# Patient Record
Sex: FEMALE | Race: WHITE | Employment: OTHER | URBAN - METROPOLITAN AREA
[De-identification: names, ages, dates, MRNs, and addresses within clinical notes are randomized per-mention and may not be internally consistent; named-entity substitution may affect disease eponyms.]

---

## 2017-04-17 PROBLEM — Z90.12: Status: ACTIVE | Noted: 2017-04-17

## 2017-04-17 PROBLEM — Z90.11: Status: ACTIVE | Noted: 2017-04-17

## 2019-05-09 ENCOUNTER — ANESTHESIA EVENT (OUTPATIENT)
Dept: ENDOSCOPY | Age: 64
End: 2019-05-09
Payer: COMMERCIAL

## 2019-05-09 RX ORDER — SODIUM CHLORIDE, SODIUM LACTATE, POTASSIUM CHLORIDE, CALCIUM CHLORIDE 600; 310; 30; 20 MG/100ML; MG/100ML; MG/100ML; MG/100ML
100 INJECTION, SOLUTION INTRAVENOUS CONTINUOUS
Status: CANCELLED | OUTPATIENT
Start: 2019-05-09

## 2019-05-10 ENCOUNTER — ANESTHESIA (OUTPATIENT)
Dept: ENDOSCOPY | Age: 64
End: 2019-05-10
Payer: COMMERCIAL

## 2019-05-10 ENCOUNTER — HOSPITAL ENCOUNTER (OUTPATIENT)
Age: 64
Setting detail: OUTPATIENT SURGERY
Discharge: HOME | End: 2019-05-10
Attending: SURGERY | Admitting: SURGERY
Payer: COMMERCIAL

## 2019-05-10 VITALS
OXYGEN SATURATION: 100 % | BODY MASS INDEX: 19.26 KG/M2 | SYSTOLIC BLOOD PRESSURE: 163 MMHG | RESPIRATION RATE: 16 BRPM | HEART RATE: 66 BPM | HEIGHT: 61 IN | WEIGHT: 102 LBS | DIASTOLIC BLOOD PRESSURE: 81 MMHG | TEMPERATURE: 98.6 F

## 2019-05-10 PROCEDURE — 74011250636 HC RX REV CODE- 250/636

## 2019-05-10 PROCEDURE — 74011250636 HC RX REV CODE- 250/636: Performed by: ANESTHESIOLOGY

## 2019-05-10 PROCEDURE — 76060000031 HC ANESTHESIA FIRST 0.5 HR: Performed by: SURGERY

## 2019-05-10 PROCEDURE — 76040000025: Performed by: SURGERY

## 2019-05-10 PROCEDURE — 77030021593 HC FCPS BIOP ENDOSC BSC -A: Performed by: SURGERY

## 2019-05-10 RX ORDER — SODIUM CHLORIDE, SODIUM LACTATE, POTASSIUM CHLORIDE, CALCIUM CHLORIDE 600; 310; 30; 20 MG/100ML; MG/100ML; MG/100ML; MG/100ML
100 INJECTION, SOLUTION INTRAVENOUS CONTINUOUS
Status: DISCONTINUED | OUTPATIENT
Start: 2019-05-10 | End: 2019-05-10 | Stop reason: HOSPADM

## 2019-05-10 RX ORDER — PROPOFOL 10 MG/ML
INJECTION, EMULSION INTRAVENOUS AS NEEDED
Status: DISCONTINUED | OUTPATIENT
Start: 2019-05-10 | End: 2019-05-10 | Stop reason: HOSPADM

## 2019-05-10 RX ORDER — LIDOCAINE HYDROCHLORIDE 20 MG/ML
INJECTION, SOLUTION EPIDURAL; INFILTRATION; INTRACAUDAL; PERINEURAL AS NEEDED
Status: DISCONTINUED | OUTPATIENT
Start: 2019-05-10 | End: 2019-05-10 | Stop reason: HOSPADM

## 2019-05-10 RX ADMIN — PROPOFOL 40 MG: 10 INJECTION, EMULSION INTRAVENOUS at 11:13

## 2019-05-10 RX ADMIN — LIDOCAINE HYDROCHLORIDE 60 MG: 20 INJECTION, SOLUTION EPIDURAL; INFILTRATION; INTRACAUDAL; PERINEURAL at 11:13

## 2019-05-10 RX ADMIN — SODIUM CHLORIDE, SODIUM LACTATE, POTASSIUM CHLORIDE, AND CALCIUM CHLORIDE 100 ML/HR: 600; 310; 30; 20 INJECTION, SOLUTION INTRAVENOUS at 10:56

## 2019-05-10 RX ADMIN — LIDOCAINE HYDROCHLORIDE 20 MG: 20 INJECTION, SOLUTION EPIDURAL; INFILTRATION; INTRACAUDAL; PERINEURAL at 11:15

## 2019-05-10 RX ADMIN — LIDOCAINE HYDROCHLORIDE 20 MG: 20 INJECTION, SOLUTION EPIDURAL; INFILTRATION; INTRACAUDAL; PERINEURAL at 11:17

## 2019-05-10 RX ADMIN — LIDOCAINE HYDROCHLORIDE 20 MG: 20 INJECTION, SOLUTION EPIDURAL; INFILTRATION; INTRACAUDAL; PERINEURAL at 11:20

## 2019-05-10 NOTE — H&P
History and Physical      Patient: Emmett Lyons    Physician: Jocy Woo MD    Referring Physician: Lamar Azevedo NP    Chief Complaint: For upper endoscopy    History of Present Illness: Pt presents for EGD for: hawk's surveillance. EGD history-     9/2011- no Hawk's  6/29/12-(+)  6/11/14-pos  9/5/14-pos  9/16/16-pos        History:  Past Medical History:   Diagnosis Date    Abdominal pain, left lower quadrant     Arthritis     fingers    Hawk's esophagus     Calculus of kidney     Chronic pain     abdominal pain    Diverticulosis     Former smoker     GERD (gastroesophageal reflux disease)     hx of nissen fundoplication---takes omeprazole--states well controlled    History of kidney stones     Hyperlipidemia     managed w/med    Hypertension     managed w/med    IBS (irritable bowel syndrome)     Inguinal hernia, right     Post-operative nausea and vomiting     \"IV meds worked\" per pt    Postcholecystectomy diarrhea     PUD (peptic ulcer disease) 2014    omeprazole bid    Trigger finger     thumb on right hand; resolved per pt.      Past Surgical History:   Procedure Laterality Date    HX ABDOMINAL WALL DEFECT REPAIR  09/2015    lysis of abdominal; adhesions    HX BREAST RECONSTRUCTION Bilateral 11/8/2016    BILATERAL BREAST RECONSTRUCTION WITH PLACEMENT OF TISSUE EXPANDERS performed by Warden Stacia MD at [address] HX CHOLECYSTECTOMY      HX COLONOSCOPY  2012 2014 EGD    HX GI      nissen fundoplication    HX HERNIA REPAIR Left 10/2015    inguinal    HX HERNIA REPAIR Right     HX HYSTERECTOMY  1980    HX LYSIS OF ADHESIONS      HX MASTECTOMY Bilateral 11/8/2016    BILATERAL BREAST MASTECTOMY SIMPLE performed by Jocy Woo MD at [address] HX SALPINGO-OOPHORECTOMY  1981    HX TONSILLECTOMY      HX UROLOGICAL      kidney stone extraction x 2    [place] EGD TRANSORAL BIOPSY SINGLE/MULTIPLE  6/29/2012         [place] EGD TRANSORAL BIOPSY SINGLE/MULTIPLE  2014         [place] EGD TRANSORAL BIOPSY SINGLE/MULTIPLE  2014         MI EGD TRANSORAL BIOPSY SINGLE/MULTIPLE  2016           Social History     Socioeconomic History    Marital status:      Spouse name: Not on file    Number of children: Not on file    Years of education: Not on file    Highest education level: Not on file   Tobacco Use    Smoking status: Former Smoker     Packs/day: 0.50     Years: 20.00     Pack years: 10.00     Last attempt to quit: 2013     Years since quittin.0    Smokeless tobacco: Never Used   Substance and Sexual Activity    Alcohol use: Yes     Alcohol/week: 0.0 oz     Comment: occasionally drinks wine    Drug use: No    Sexual activity: Yes     Partners: Male     Comment: hysterectomy      Family History   Problem Relation Age of Onset    Cancer Mother         Breast    Hypertension Mother     Heart Disease Mother     Heart Disease Father 71        MI    Hypertension Sister     Breast Cancer Sister 61    Cancer Sister         breast    No Known Problems Brother        Medications:   Prior to Admission medications    Medication Sig Start Date End Date Taking? Authorizing Provider   doxylamine succinate (UNISOM) 25 mg tablet Take 25 mg by mouth nightly as needed for Sleep. Yes Provider, Historical   estradiol cypionate in oil (DEPO-ESTRADIOL) 5 mg/mL injection 1 cc IM every 3-4 weeks 17  Yes Patricia Burnett MD   omeprazole (PRILOSEC) 40 mg capsule Take 1 Cap by mouth two (2) times a day. Indications: GASTRIC ULCER, GASTROESOPHAGEAL REFLUX, MAINTENANCE OF HEALING EROSIVE ESOPHAGITIS 16  Yes Lexus Brody MD   losartan-hydrochlorothiazide [facility]) 50-12.5 mg per tablet Take 1 Tab by mouth daily. Indications: HYPERTENSION  Patient taking differently: Take 1 Tab by mouth every morning. Indications: HYPERTENSION 9/17/15  Yes Venkatesh Dinero NP   multivitamin (ONE A DAY) tablet Take 1 Tab by mouth daily.  Holding for procedure  Indications: VITAMIN DEFICIENCY PREVENTION, hold for  surgery 10/6/15   Yes Provider, Historical   Cholecalciferol, Vitamin D3, (VITAMIN D) 2,000 unit Cap Take 2,000 Units by mouth daily. Indications: PREVENTION OF VITAMIN D DEFICIENCY, hold for  surgery 10/6/15   Yes Provider, Historical   omega-3 fatty acids-vitamin e (FISH OIL) 1,000 mg Cap Take 1 Cap by mouth daily (after breakfast). Indications: hold for  surgery 10/6/15   Yes Provider, Historical       Allergies: No Known Allergies    Physical Exam:     Vital Signs:   Visit Vitals  /76   Pulse 62   Temp 97.8 °F (36.6 °C)   Resp 18   Ht 5' 1\" (1.549 m)   Wt 102 lb (46.3 kg)   SpO2 99%   BMI 19.27 kg/m²     . General: no distress      Heart: regular   Lungs: unlabored   Abdominal: soft   Neurological: Grossly normal        Findings/Diagnosis: Munguia's esophagus    Plan of Care/Planned Procedure: Esophagogastroduodenoscopy, possible biopsy. Risks of endoscopy  include bleeding and perforation. They understand and agree to proceed.       Signed:  Magnus Aguilar MD   5/10/2019

## 2019-05-10 NOTE — ANESTHESIA PREPROCEDURE EVALUATION
Relevant Problems No relevant active problems Anesthetic History PONV Review of Systems / Medical History Pertinent labs reviewed Pulmonary Within defined limits Neuro/Psych Within defined limits Cardiovascular Hypertension Exercise tolerance: >4 METS 
  
GI/Hepatic/Renal 
  
GERD Renal disease: stones Comments: S/p Nissen IBS Munguia's esophagus Endo/Other Arthritis Other Findings Physical Exam 
 
Airway Mallampati: II 
TM Distance: 4 - 6 cm Neck ROM: normal range of motion Mouth opening: Normal 
 
 Cardiovascular Regular rate and rhythm,  S1 and S2 normal,  no murmur, click, rub, or gallop Dental 
 
Dentition: Full lower dentures and Upper partial plate Pulmonary Breath sounds clear to auscultation Abdominal 
GI exam deferred Other Findings Anesthetic Plan ASA: 2 Anesthesia type: total IV anesthesia Induction: Intravenous Anesthetic plan and risks discussed with: Patient

## 2019-05-10 NOTE — DISCHARGE INSTRUCTIONS
Gastrointestinal Esophagogastroduodenoscopy (EGD) - Upper Exam Discharge Instructions    1. Call Dr. Kevon Nelson at [phone] for any problems or questions. 2. Contact the doctor's office for follow up appointment as directed. 3. Medication may cause drowsiness for several hours, therefore:  · Do not drive or operate machinery for remainder of the day. · No alcohol today. · Do not make any important or legal decisions for 24 hours. · Do not sign any legal documents for 24 hours. 5. Ordinarily, you may resume regular diet and activity after exam unless otherwise specified by your physician. 6. For mild soreness in your throat you may use Cepacol throat lozenges or warm salt-water gargles as needed. Any additional instructions:    -MD will call with biopsy results  -Follow up with GI doctor in [place]     Instructions given to Chava Hui and other family members.   Instructions given by:  Suresh Ross RN

## 2019-05-10 NOTE — ANESTHESIA POSTPROCEDURE EVALUATION
Procedure(s): ESOPHAGOGASTRODUODENOSCOPY (EGD)/ 18 
ESOPHAGOGASTRODUODENAL (EGD) BIOPSY. total IV anesthesia Anesthesia Post Evaluation Patient location during evaluation: PACU Patient participation: complete - patient participated Level of consciousness: awake Pain management: satisfactory to patient Airway patency: patent Anesthetic complications: no 
Cardiovascular status: hemodynamically stable Respiratory status: spontaneous ventilation Hydration status: euvolemic Post anesthesia nausea and vomiting:  none Vitals Value Taken Time /81 5/10/2019 11:58 AM  
Temp Pulse 60 5/10/2019 12:01 PM  
Resp 16 5/10/2019 11:58 AM  
SpO2 99 % 5/10/2019 12:01 PM  
Vitals shown include unvalidated device data.

## 2019-05-10 NOTE — PROCEDURES
Esophagogastroduodenoscopy Procedure Note      Patient: Malcolm Holguin MRN: [id number]     YOB: 1955  Age: 75 y.o. Sex: female           Procedure in Detail:  Informed consent was obtained for the procedure, the patient was brought to the endoscopy suite and sedation was induced by anesthesia. The [id number] gastroscope was inserted into the mouth and advanced under direct vision to second portion of the duodenum. A careful inspection was made as the gastroscope was withdrawn, including a retroflexed view of the proximal stomach; findings and interventions are described below, with appropriate photodocumentation obtained. Findings:   Oropharynx:  Normal  Esophagus:    - Flat lesions:     - irregular, scallopped EGJ in addition to solitary 5mm island of probable Munguia's approx 5mm proximal  Cardia of the stomach:  Normal  Body of the stomach:    - Flat lesions:     - mild gastritis  Fundus of the stomach:    - Evidence of prior surgery: Nissen Fundoplication  Antrum of the stomach:    - Flat lesions:     - biliary gastritis  Duodenum:  Normal    Therapies:    biopsy of esophagus    EBL-  minimal    Specimens: Specimens were collected and sent to pathology. Complications:   None; patient tolerated the procedure well. Recommendations:  - Continue acid suppression.  - Await pathology. Continue f/u with GI in [place].   No significant breakdown of prior HH repair/fundoplication    Signed by: Milo Lucas MD                         May 10, 2019

## 2019-05-10 NOTE — ROUTINE PROCESS
Patient discharged. Passing flatus. Tolerating po fluids. Belongings returned. No acute distress noted. Escorted to car, with family by Lonkevin Cleveland.

## 2020-07-08 RX ORDER — SODIUM CHLORIDE 0.9 % (FLUSH) 0.9 %
5-40 SYRINGE (ML) INJECTION AS NEEDED
Status: CANCELLED | OUTPATIENT
Start: 2020-07-08

## 2020-07-08 RX ORDER — SODIUM CHLORIDE 0.9 % (FLUSH) 0.9 %
5-40 SYRINGE (ML) INJECTION EVERY 8 HOURS
Status: CANCELLED | OUTPATIENT
Start: 2020-07-08

## 2020-07-08 NOTE — H&P
2020  Nola Shipley, 77, F   [facility] Plastic Surgery   Pre-Operative History and Physical  Patient Information  :  Patient Information-  Nola Shipley   : 1955   Scheduled Services for : Lexie Hernandez   Appt Purpose: Blepharoplasty Upper - Bilateral Appt Notes: Surgery 20   Hospital PreOp by Phone   Referral Source: Patient   Occupation:   Insurance: [id number] Benefits/[employer]ign[employer] Plan: [id number] Benefits/Cigna   Surgery date: 2020 Blepharoplasty Upper - Bilateral Dr Anita NELSON   Medications / Allergies  :  Non-steroidal counseling  Patient was counseled to avoid all non-steroidals for 7-10 days before any surgery   List any allergies: Allergy   1 NKA   List any current medications (Please include over-the-counter medications)      Drug   1. blood pressure   2. cholesterol   3. acid reflux   Non-steroidal anti-inflammatory drugs (ex. Motrin, Aleve)  Patient admits regular use of NSAID's. Do you take vitamins/minerals? Yes If yes, what kind?     multi, fish oil    Height / Weight / BMI:  Height/Weight/BMI  Her height is 5ft 1in and weight is 103lbs. BMI is 19.46. Social / Family History:  ~MUS2 - Smoking Status  Former smoker. Social History  Patient denies using illegal drugs, denies high risk factors and denies alcohol use.    Family History  Breast Cancer Mother, Sister   High Blood Pressure  Vital Signs:  ~MUS2 - Blood Pressure  120/80   Vitals  Pulse 67 and RAPS02 97%   Pre-Operative Diagnosis:  Diagnosis - Text  Diagnosis: Upper Lid Dermatochalasis   Planned Procedure:  Signature        Planned Procedure  Upper Lid Blepheroplasty   History of Present Illness  Past Medical / Surgical History:  Past Medical History  High Blood Pressure <Details>   Kidney Stones <Details>  Review of Systems (check all current symptoms)  arthritis/joint disformity - no asthma - no chest pain - no heart attack - no high blood pressure *YES* inflamation of veins - no murmur - no pacemaker - no phlebitis - no convulsions - no epilepsy or seizures - no fainting - no fever or chills - no heart disease - no nausea, vomiting, diarrheah when taking antibiotics - no unexpected weight loss or weight gain - no chronic cough - no hearing loss - no morning cough - no sinus disorder - no diabetes - no excessive thirst/hunger - no frequent urination - no thyroid disorder - no abdominal pain - no chronic diarrhea or constipation - no gastro-intestinal problems - no irregular heartbeat - no stomach absorptive disorder - no ulcers - no bladder problems - no currently breast feeding - no currently pregnant - no frequency/burning during urination - no kidney problems - no yeast infection - no anemia - no bleed easily - no blood clots - no blood transfusion - no arthralgia - no artificial joints - no limited motion in joints - no muscle weakness - no paralysis - no stroke - no numbness or tingling - no headache - no migraines - no bronchitis - no emphysema - no shortness of breath - no tuberculosis - no wheezing - no psychiatric treatment - no recent crisis - no history of psychiatric hospitilization - no   Past Surgeries  hysterectomy   galbladder   mastectomy  Anesthesia History  - No past anesthesia problems. Ethnic Origin         Ethnic Origin II. Fair-skinned (Caucasians with light hair and light eyes)   Diagnosis Codes   :  Diagnosis - Eyes2  Dermatochalasis , Upper Right and Dermatochalasis, Upper Left   Diagnosis Codes  Eyes   Pre-Operative Physical Exam:  HEENT  HEENT: Deferred. Cardiac Exam II  Cardiac Exam: RRR and WNL. Pulmonary Exam  Pulmonary Exam: WNL. Abdominal Exam  Abdominal Exam: Deferred.    Extremity & Neuro Exam  Pertinent System/Surgical Site Exam  Pre-Operative Checklist / Risks & Benefits:  Blepharoplasty - Upper Risks Discussed  The risks of an upper lid blepharoplasty were reviewed with the patient, their questions were answered, and they have reviewed and signed off on the appropriate ASPS consent form. They state they understand the risks to include, but not be limited to: bleeding, infection, scars, asymmetry, poor cosmetic result, chronic dry or irritable eyes, inability to close the eyelid, corneal injury, muscle injury, blindness, loss of lashes, and the need for additional surgery. Understanding these issues they wish to proceed. The office policies regarding costs associated with revision was discussed as well. Pre-op Checklist  1. Patient seen preoperatively. 2. All patient questions answered. 3. Risks and benefits (including alternative treatments) reviewed. 4. Surgery time and date reviewed. 5. Proper prescriptions given. 6. Photographs taken  7. Complications both expected and unexpected discussed  8.  Patient verbalized understanding of the outcome possibilites inherent with this procedure  Pre-Op Risks and Benefits  bleeding, scar, infection, less than aesthetic result, need for additional surgery, bruising, swelling, hypertrophic scar, wound breakdown, recurrence, asymmetry, numbness, contour irregularities, hematoma, seroma, DVT, under or over resection and partial skin flap loss   Surgical Risks Discussed  Blepharoplasty - Upper   Pre-Operative Orders:  Pre-Operative Antibiotic Order Selection  Cefazolin 1GM IV piggyback (<80kg)   Pre-Operative Orders  Nothing by mouth after midnight, SCD's per Anesthesia Guidelines, Pre-Operative Antibiotic Selection and Labs per Anesthesia   [place]:  Signature        [place] 2  Jose Manuel Singer CST   Signature - H&P - Pre-Op Orders:  Signature (Physician & Date) Pre-Op        Providers  Gladis Garcia  Diagnosis Codes  A85.705 - Dermatochalasis of right upper eyelid (374.87)   S41.599 - Dermatochalasis of left upper eyelid (374.87)   H53.451 - Other localized visual field defect, right eye (368.44)   H53.452 - Other localized visual field defect, left eye (415.44)

## 2020-07-14 ENCOUNTER — HOSPITAL ENCOUNTER (OUTPATIENT)
Dept: SURGERY | Age: 65
Discharge: HOME | End: 2020-07-14

## 2020-07-16 ENCOUNTER — HOSPITAL ENCOUNTER (OUTPATIENT)
Dept: LAB | Age: 65
Discharge: HOME | End: 2020-07-16
Payer: COMMERCIAL

## 2020-07-16 VITALS — HEIGHT: 61 IN | BODY MASS INDEX: 19.83 KG/M2 | WEIGHT: 105 LBS

## 2020-07-16 LAB — POTASSIUM SERPL-SCNC: 3.9 MMOL/L (ref 3.5–5.1)

## 2020-07-16 PROCEDURE — 84132 ASSAY OF SERUM POTASSIUM: CPT

## 2020-07-16 PROCEDURE — 36415 COLL VENOUS BLD VENIPUNCTURE: CPT

## 2020-07-16 NOTE — PERIOP NOTES
Patient verified name and . Order for consent found in EHR and matches case posting; patient verifies procedure. Bilateral upper lid blepharoplasty     Type 1B surgery, PAT phone assessment complete. Orders received. Labs per surgeon: none  Labs per anesthesia protocol: K+- Pt instructed to come for lab work (Monday- Friday 8:30 AM- 4:00 PM) prior to surgery day. Pt voiced an understanding. Patient answered medical/surgical history questions at their best of ability. All prior to admission medications documented in [facility] Care. Patient instructed to take the following medications the day of surgery according to anesthesia guidelines with a small sip of water: prilosec. Hold all vitamins/supplements 7 days prior to surgery and NSAIDS 5 days prior to surgery. Patient instructed on the following:  > 1 visitor allowed at this time. >Arrive at [facility], time of arrival to be called the day before by 1700  >NPO after midnight including gum, mints, and ice chips  >Responsible adult must drive patient to the hospital, stay during surgery, and patient will need supervision 24 hours after anesthesia  >Use antibacterial soap in shower the night before surgery and on the morning of surgery  >All piercings must be removed prior to arrival.    >Leave all valuables (money and jewelry) at home but bring insurance card and ID on  DOS. >Do not wear make-up, nail polish, lotions, cologne, perfumes, powders, or oil on skin.

## 2020-07-20 ENCOUNTER — ANESTHESIA EVENT (OUTPATIENT)
Dept: SURGERY | Age: 65
End: 2020-07-20
Payer: COMMERCIAL

## 2020-07-20 NOTE — PERIOP NOTES
K+ done 7/16/20 WNL    Results for Radha Campbell (MRN [id number]) as of 7/20/2020 08:14   Ref.  Range 7/16/2020 14:55   Potassium Latest Ref Range: 3.5 - 5.1 mmol/L 3.9

## 2020-07-21 ENCOUNTER — HOSPITAL ENCOUNTER (OUTPATIENT)
Age: 65
Setting detail: OUTPATIENT SURGERY
Discharge: HOME | End: 2020-07-21
Attending: PLASTIC SURGERY | Admitting: PLASTIC SURGERY
Payer: COMMERCIAL

## 2020-07-21 ENCOUNTER — ANESTHESIA (OUTPATIENT)
Dept: SURGERY | Age: 65
End: 2020-07-21
Payer: COMMERCIAL

## 2020-07-21 VITALS
HEART RATE: 69 BPM | RESPIRATION RATE: 16 BRPM | TEMPERATURE: 97.8 F | HEIGHT: 61 IN | DIASTOLIC BLOOD PRESSURE: 60 MMHG | BODY MASS INDEX: 18.88 KG/M2 | OXYGEN SATURATION: 96 % | WEIGHT: 100 LBS | SYSTOLIC BLOOD PRESSURE: 132 MMHG

## 2020-07-21 PROCEDURE — 74011000250 HC RX REV CODE- 250: Performed by: PLASTIC SURGERY

## 2020-07-21 PROCEDURE — 74011250636 HC RX REV CODE- 250/636: Performed by: NURSE ANESTHETIST, CERTIFIED REGISTERED

## 2020-07-21 PROCEDURE — 76060000032 HC ANESTHESIA 0.5 TO 1 HR: Performed by: PLASTIC SURGERY

## 2020-07-21 PROCEDURE — 76210000006 HC OR PH I REC 0.5 TO 1 HR: Performed by: PLASTIC SURGERY

## 2020-07-21 PROCEDURE — 74011000250 HC RX REV CODE- 250: Performed by: NURSE ANESTHETIST, CERTIFIED REGISTERED

## 2020-07-21 PROCEDURE — 74011250636 HC RX REV CODE- 250/636: Performed by: ANESTHESIOLOGY

## 2020-07-21 PROCEDURE — 77030040356 HC CORD BPLR FRCP COVD -A: Performed by: PLASTIC SURGERY

## 2020-07-21 PROCEDURE — 76210000020 HC REC RM PH II FIRST 0.5 HR: Performed by: PLASTIC SURGERY

## 2020-07-21 PROCEDURE — 76010000138 HC OR TIME 0.5 TO 1 HR: Performed by: PLASTIC SURGERY

## 2020-07-21 PROCEDURE — 77030010509 HC AIRWY LMA MSK TELE -A: Performed by: ANESTHESIOLOGY

## 2020-07-21 PROCEDURE — 77030040361 HC SLV COMPR DVT MDII -B: Performed by: PLASTIC SURGERY

## 2020-07-21 PROCEDURE — 77030013908 HC PROTCT CRNL CRCH B&L -A: Performed by: PLASTIC SURGERY

## 2020-07-21 PROCEDURE — 74011250636 HC RX REV CODE- 250/636: Performed by: PLASTIC SURGERY

## 2020-07-21 PROCEDURE — 77030018836 HC SOL IRR NACL ICUM -A: Performed by: PLASTIC SURGERY

## 2020-07-21 PROCEDURE — 77030002986 HC SUT PROL J&J -A: Performed by: PLASTIC SURGERY

## 2020-07-21 RX ORDER — LIDOCAINE HYDROCHLORIDE 20 MG/ML
INJECTION, SOLUTION EPIDURAL; INFILTRATION; INTRACAUDAL; PERINEURAL AS NEEDED
Status: DISCONTINUED | OUTPATIENT
Start: 2020-07-21 | End: 2020-07-21 | Stop reason: HOSPADM

## 2020-07-21 RX ORDER — PROPOFOL 10 MG/ML
INJECTION, EMULSION INTRAVENOUS AS NEEDED
Status: DISCONTINUED | OUTPATIENT
Start: 2020-07-21 | End: 2020-07-21 | Stop reason: HOSPADM

## 2020-07-21 RX ORDER — MIDAZOLAM HYDROCHLORIDE 1 MG/ML
2 INJECTION, SOLUTION INTRAMUSCULAR; INTRAVENOUS
Status: COMPLETED | OUTPATIENT
Start: 2020-07-21 | End: 2020-07-21

## 2020-07-21 RX ORDER — NALOXONE HYDROCHLORIDE 0.4 MG/ML
0.1 INJECTION, SOLUTION INTRAMUSCULAR; INTRAVENOUS; SUBCUTANEOUS AS NEEDED
Status: DISCONTINUED | OUTPATIENT
Start: 2020-07-21 | End: 2020-07-22 | Stop reason: HOSPADM

## 2020-07-21 RX ORDER — ONDANSETRON 2 MG/ML
INJECTION INTRAMUSCULAR; INTRAVENOUS AS NEEDED
Status: DISCONTINUED | OUTPATIENT
Start: 2020-07-21 | End: 2020-07-21 | Stop reason: HOSPADM

## 2020-07-21 RX ORDER — EPHEDRINE SULFATE/0.9% NACL/PF 50 MG/5 ML
SYRINGE (ML) INTRAVENOUS AS NEEDED
Status: DISCONTINUED | OUTPATIENT
Start: 2020-07-21 | End: 2020-07-21 | Stop reason: HOSPADM

## 2020-07-21 RX ORDER — OXYCODONE HYDROCHLORIDE 5 MG/1
5 TABLET ORAL
Status: DISCONTINUED | OUTPATIENT
Start: 2020-07-21 | End: 2020-07-22 | Stop reason: HOSPADM

## 2020-07-21 RX ORDER — DIPHENHYDRAMINE HYDROCHLORIDE 50 MG/ML
12.5 INJECTION, SOLUTION INTRAMUSCULAR; INTRAVENOUS
Status: DISCONTINUED | OUTPATIENT
Start: 2020-07-21 | End: 2020-07-22 | Stop reason: HOSPADM

## 2020-07-21 RX ORDER — FENTANYL CITRATE 50 UG/ML
INJECTION, SOLUTION INTRAMUSCULAR; INTRAVENOUS AS NEEDED
Status: DISCONTINUED | OUTPATIENT
Start: 2020-07-21 | End: 2020-07-21 | Stop reason: HOSPADM

## 2020-07-21 RX ORDER — LIDOCAINE HYDROCHLORIDE AND EPINEPHRINE 10; 10 MG/ML; UG/ML
INJECTION, SOLUTION INFILTRATION; PERINEURAL AS NEEDED
Status: DISCONTINUED | OUTPATIENT
Start: 2020-07-21 | End: 2020-07-21 | Stop reason: HOSPADM

## 2020-07-21 RX ORDER — SODIUM CHLORIDE 0.9 % (FLUSH) 0.9 %
5-40 SYRINGE (ML) INJECTION AS NEEDED
Status: DISCONTINUED | OUTPATIENT
Start: 2020-07-21 | End: 2020-07-21 | Stop reason: HOSPADM

## 2020-07-21 RX ORDER — LIDOCAINE HYDROCHLORIDE 10 MG/ML
0.1 INJECTION INFILTRATION; PERINEURAL AS NEEDED
Status: DISCONTINUED | OUTPATIENT
Start: 2020-07-21 | End: 2020-07-21 | Stop reason: HOSPADM

## 2020-07-21 RX ORDER — SODIUM CHLORIDE, SODIUM LACTATE, POTASSIUM CHLORIDE, CALCIUM CHLORIDE 600; 310; 30; 20 MG/100ML; MG/100ML; MG/100ML; MG/100ML
75 INJECTION, SOLUTION INTRAVENOUS CONTINUOUS
Status: DISCONTINUED | OUTPATIENT
Start: 2020-07-21 | End: 2020-07-22 | Stop reason: HOSPADM

## 2020-07-21 RX ORDER — CEFAZOLIN SODIUM/WATER 2 G/20 ML
2 SYRINGE (ML) INTRAVENOUS ONCE
Status: COMPLETED | OUTPATIENT
Start: 2020-07-21 | End: 2020-07-21

## 2020-07-21 RX ORDER — SODIUM CHLORIDE 0.9 % (FLUSH) 0.9 %
5-40 SYRINGE (ML) INJECTION EVERY 8 HOURS
Status: DISCONTINUED | OUTPATIENT
Start: 2020-07-21 | End: 2020-07-21 | Stop reason: HOSPADM

## 2020-07-21 RX ORDER — SODIUM CHLORIDE, SODIUM LACTATE, POTASSIUM CHLORIDE, CALCIUM CHLORIDE 600; 310; 30; 20 MG/100ML; MG/100ML; MG/100ML; MG/100ML
100 INJECTION, SOLUTION INTRAVENOUS CONTINUOUS
Status: DISCONTINUED | OUTPATIENT
Start: 2020-07-21 | End: 2020-07-21 | Stop reason: HOSPADM

## 2020-07-21 RX ORDER — HYDROMORPHONE HYDROCHLORIDE 2 MG/ML
0.5 INJECTION, SOLUTION INTRAMUSCULAR; INTRAVENOUS; SUBCUTANEOUS
Status: DISCONTINUED | OUTPATIENT
Start: 2020-07-21 | End: 2020-07-22 | Stop reason: HOSPADM

## 2020-07-21 RX ORDER — FLUMAZENIL 0.1 MG/ML
0.2 INJECTION INTRAVENOUS
Status: DISCONTINUED | OUTPATIENT
Start: 2020-07-21 | End: 2020-07-22 | Stop reason: HOSPADM

## 2020-07-21 RX ADMIN — Medication 12.5 MG: at 08:12

## 2020-07-21 RX ADMIN — MIDAZOLAM 2 MG: 1 INJECTION INTRAMUSCULAR; INTRAVENOUS at 07:35

## 2020-07-21 RX ADMIN — PROPOFOL 150 MG: 10 INJECTION, EMULSION INTRAVENOUS at 07:49

## 2020-07-21 RX ADMIN — Medication 12.5 MG: at 07:55

## 2020-07-21 RX ADMIN — SODIUM CHLORIDE, SODIUM LACTATE, POTASSIUM CHLORIDE, AND CALCIUM CHLORIDE 100 ML/HR: 600; 310; 30; 20 INJECTION, SOLUTION INTRAVENOUS at 06:59

## 2020-07-21 RX ADMIN — ONDANSETRON 4 MG: 2 INJECTION INTRAMUSCULAR; INTRAVENOUS at 08:23

## 2020-07-21 RX ADMIN — Medication 10 MG: at 08:29

## 2020-07-21 RX ADMIN — FENTANYL CITRATE 25 MCG: 50 INJECTION INTRAMUSCULAR; INTRAVENOUS at 08:00

## 2020-07-21 RX ADMIN — Medication 10 MG: at 08:21

## 2020-07-21 RX ADMIN — FENTANYL CITRATE 25 MCG: 50 INJECTION INTRAMUSCULAR; INTRAVENOUS at 08:08

## 2020-07-21 RX ADMIN — SODIUM CHLORIDE, SODIUM LACTATE, POTASSIUM CHLORIDE, AND CALCIUM CHLORIDE: 600; 310; 30; 20 INJECTION, SOLUTION INTRAVENOUS at 08:23

## 2020-07-21 RX ADMIN — Medication 2 G: at 07:41

## 2020-07-21 RX ADMIN — LIDOCAINE HYDROCHLORIDE 50 MG: 20 INJECTION, SOLUTION EPIDURAL; INFILTRATION; INTRACAUDAL; PERINEURAL at 07:49

## 2020-07-21 NOTE — PROGRESS NOTES
Discharge instructions reviewed with pt and family member who verbalize understanding of follow up care. Personal belongings returned including upper and lower dentures. Assisted into wheelchair and taken to discharge area.

## 2020-07-21 NOTE — DISCHARGE INSTRUCTIONS
Patient Education        Eyelid Surgery: What to Expect at Home  Your Recovery  After surgery, your eyelid may feel tight and sore. Your eye may be watery, dry, sticky, itchy, or sensitive to light. Your vision may be blurry for a few days. Your doctor will give you medicines to help with pain and discomfort. It is important to keep your eyelid clean and to avoid rubbing it. Follow your doctor's instructions on how to clean and care for your eye. Your stitches may dissolve on their own. Or your doctor may remove them 3 to 5 days after surgery. Your eyelid may be swollen and bruised for 1 to 3 weeks after surgery. The appearance of your eye may continue to get better for 1 to 3 months. Most people feel ready to go out in public and back to work in about 10 to 14 days. This may depend on your job and how you feel about people knowing about your surgery. Even after 2 weeks, you may still have some bruising around your eyes. After surgery for a droopy eyelid, or ptosis (say \"LC-nadiya\"), you may find that your lid does not lower as much when you look down. Or you may find that your lid does not close fully when you sleep. If this occurs, tell your doctor. He or she may suggest putting drops or gels in the eye to keep it moist.  For the first few weeks, your eye will be swollen. When the swelling is gone, you'll be able to see the changes. This care sheet gives you a general idea about how long it will take for you to recover. But each person recovers at a different pace. Follow the steps below to get better as quickly as possible. How can you care for yourself at home? Activity    · Rest when you feel tired. Getting enough sleep will help you recover.     · Keep your head raised for several days after surgery. Sleep with your head up by using 2 or 3 pillows.     · Ask your doctor when it is okay to drive.     · Your eyes will get tired easily.  Limit reading, computer work, and TV for the first few days.     · Do not wear contact lenses for about 2 weeks or until your doctor says it is okay.     · Do not wear eye makeup for 2 weeks. You may also want to avoid face cream or lotion.     · You can shower or wash your hair the day after surgery. Keep water, soap, shampoo, hair spray, and shaving lotion out of your eye, especially for the first week.     · Do not rub or put pressure on your eye for at least 1 week.     · Do not get your hair colored or permed for 10 days after surgery.     · Do not bend over or do any strenuous activities, such as biking, jogging, weight lifting, or aerobic exercise, for 2 weeks or until your doctor says it is okay.     · Avoid swimming, hot tubs, gardening, and dusting for 1 to 2 weeks.     · Wear sunglasses on bright days for 1 year after surgery. Medicines    · Your doctor will tell you if and when you can restart your medicines. He or she will also give you instructions about taking any new medicines.     · If you take aspirin or some other blood thinner, ask your doctor if and when to start taking it again. Make sure that you understand exactly what your doctor wants you to do.     · Follow your doctor's instructions for when to use eye drops and antibiotic ointment. Always wash your hands before you put your drops in. To put in eye drops:  ? Tilt your head back, and pull your lower eyelid down with one finger. ? Drop or squirt the medicine inside the lower lid. ? Close your eye for 30 to 60 seconds to let the drops or ointment move around. ? Do not touch the ointment or dropper tip to your eyelashes or any other surface.     · Follow your doctor's instructions for taking pain medicines. Incision care    · If you have a bandage on your eye, wear it for as long as your doctor recommends.     · Keep the area clean and dry. Ice    · Close your eye and put ice or a cold pack on it for 10 to 20 minutes at a time.  Try to do this every 1 to 2 hours for the next 3 days (when you are awake) or until the swelling goes down. Put a thin cloth between the ice and your skin. Follow-up care is a key part of your treatment and safety. Be sure to make and go to all appointments, and call your doctor if you are having problems. It's also a good idea to know your test results and keep a list of the medicines you take. When should you call for help? Call 911 anytime you think you may need emergency care. For example, call if:    · You passed out (lost consciousness).     · You have severe trouble breathing.     · You have sudden chest pain and shortness of breath, or you cough up blood.    Call your doctor now or seek immediate medical care if:    · You have pain that does not get better after you take pain medicine.     · You have loose stitches, or your incision comes open.     · You are bleeding from the incision.     · You have signs of infection, such as:  ? Increased pain, swelling, warmth, or redness. ? Red streaks leading from the incision. ? Pus draining from the incision. ? A fever.     · You have signs of a blood clot in your leg, such as:  ? Pain in your calf, back of the knee, thigh, or groin. ? Redness and swelling in your leg or groin.     · You have vision changes.    Watch closely for any changes in your health, and be sure to contact your doctor if:    · You do not get better as expected. Where can you learn more? Go to http://keith-yolanda.info/  Enter X185 in the search box to learn more about \"Eyelid Surgery: What to Expect at Home. \"  Current as of: October 30, 2019Content Version: 12.4  © 6771-9567 [employer]. Care instructions adapted under license by [employer] (which disclaims liability or warranty for this information). If you have questions about a medical condition or this instruction, always ask your healthcare professional. [address] any warranty or liability for your use of this information.

## 2020-07-21 NOTE — ANESTHESIA PREPROCEDURE EVALUATION
Relevant Problems   No relevant active problems       Anesthetic History     PONV          Review of Systems / Medical History  Patient summary reviewed and pertinent labs reviewed    Pulmonary          Smoker (former)         Neuro/Psych   Within defined limits           Cardiovascular    Hypertension          Hyperlipidemia    Exercise tolerance: >4 METS: Very active without chest pain, SOB, syncope      GI/Hepatic/Renal     GERD: well controlled    Renal disease: stones  PUD    Comments: S/p Nissen  IBS  Munguia's esophagus Endo/Other        Arthritis     Other Findings              Physical Exam    Airway  Mallampati: I  TM Distance: 4 - 6 cm  Neck ROM: normal range of motion   Mouth opening: Normal     Cardiovascular  Regular rate and rhythm,  S1 and S2 normal,  no murmur, click, rub, or gallop             Dental    Dentition: Full lower dentures and Upper partial plate     Pulmonary  Breath sounds clear to auscultation               Abdominal  GI exam deferred       Other Findings            Anesthetic Plan    ASA: 2  Anesthesia type: general  LMA        Induction: Intravenous  Anesthetic plan and risks discussed with: Patient and Spouse

## 2020-07-21 NOTE — ANESTHESIA POSTPROCEDURE EVALUATION
Procedure(s):  BILATERAL UPPER LID BLEPHAROPLASTY.     general    Anesthesia Post Evaluation      Multimodal analgesia: multimodal analgesia used between 6 hours prior to anesthesia start to PACU discharge  Patient location during evaluation: PACU  Patient participation: complete - patient participated  Level of consciousness: awake and alert  Pain management: adequate  Airway patency: patent  Anesthetic complications: no  Cardiovascular status: acceptable  Respiratory status: acceptable  Hydration status: acceptable  Post anesthesia nausea and vomiting:  controlled      INITIAL Post-op Vital signs:   Vitals Value Taken Time   /60 7/21/2020  9:20 AM   Temp 36.6 °C (97.8 °F) 7/21/2020  8:40 AM   Pulse 69 7/21/2020  9:20 AM   Resp 16 7/21/2020  9:20 AM   SpO2 96 % 7/21/2020  9:20 AM

## 2020-07-21 NOTE — BRIEF OP NOTE
Brief Postoperative Note    Patient: Milagro Bartlett  YOB: 1955  MRN: [id number]    Date of Procedure: 7/21/2020     Pre-Op Diagnosis: Dermatochalasis of eyelids of both eyes, unspecified eyelid [H02.833, H02.836]  Other localized visual field defect, bilateral [H53.453]    Post-Op Diagnosis: same      Procedure(s):  BILATERAL UPPER LID BLEPHAROPLASTY    Surgeon(s):  Anju Medina MD    Surgical Assistant: None    Anesthesia: General     Estimated Blood Loss (mL): 3 cc    Complications: none    Specimens: * No specimens in log *     Implants: * No implants in log *    Drains: * No LDAs found *    Findings: none    Electronically Signed by Ashley Simpson MD on 7/21/2020 at 8:35 AM

## 2020-07-31 NOTE — OP NOTES
[place]  OPERATIVE REPORT    Name:  Ke Altamirano  MR#:  [id number]  :  1955  ACCOUNT #:  [de-identified]  DATE OF SERVICE:  2020    PREOPERATIVE DIAGNOSIS:  Dermatochalasis with visual field obstruction. POSTOPERATIVE DIAGNOSIS:  Dermatochalasis with visual field obstruction. PROCEDURE PERFORMED:  Bilateral upper lid blepharoplasty. SURGEON:  Diana Messer MD    ASSISTANT:  Sae EVANGELISTA    ANESTHESIA:  General.    COMPLICATIONS:  None. SPECIMENS REMOVED:  None. IMPLANTS:  none    ESTIMATED BLOOD LOSS:  Minimal.    INDICATIONS:  The patient presents with significant dermatochalasis causing visual field obstruction. This has been documented by her ophthalmologist.    PROCEDURE:  After informed consent was obtained from the patient, please see preop and consultation notes for details of our discussions. She was taken to the operating room, placed in the supine position, and prepped and draped in the usual fashion. The upper eyelids were marked with transverse incisions approximately 3-4 mm above the ciliary margin and then the estimated amount of skin excision was also marked into an elliptical fashion. The patient was anesthetized with 1% lidocaine with epinephrine and then I began on the right upper lid. Here a 15 blade was used to incise the skin, the skin only was removed and then additional Bipolar cautery was used for muscle contraction as well as for hemostasis. The medial fat pad was not bulging or excessive and it was not removed. Same technique was used on the left upper lid with similar skin excision and no fat pad removal; however, I did do cautery and muscle contraction for the patient. She was then compared for symmetry prior to closure and then a running 6-0 Prolene subcuticular stitch was placed on both sides. These were secured with Steri-Strips at the radix and the temporal areas bilaterally.   The patient tolerated the procedure very well and she was escorted to Recovery in stable condition.       MD JOSSELYN [id number]  D:  07/31/2020 9:51  T:  07/31/2020 15:04  JOB #:  [id number]

## 2022-02-16 ENCOUNTER — HOME HEALTH ADMISSION (OUTPATIENT)
Dept: HOME HEALTH SERVICES | Facility: HOME HEALTH | Age: 67
End: 2022-02-16

## 2022-03-18 PROBLEM — Z90.12: Status: ACTIVE | Noted: 2017-04-17

## 2022-03-20 PROBLEM — Z90.11: Status: ACTIVE | Noted: 2017-04-17

## 2023-03-07 ENCOUNTER — HOSPITAL ENCOUNTER (OUTPATIENT)
Dept: CT IMAGING | Age: 68
Discharge: HOME | End: 2023-03-10

## 2023-03-07 DIAGNOSIS — R10.32 LEFT LOWER QUADRANT PAIN: ICD-10-CM

## 2023-10-31 ENCOUNTER — TELEPHONE (OUTPATIENT)
Dept: OBGYN CLINIC | Age: 68
End: 2023-10-31

## 2023-10-31 NOTE — TELEPHONE ENCOUNTER
Josselyn Esposito, Luis Alberto Suero RN  Good morning. Pt stated that she will give us a call when she is able to come in for an annual appt. Previous Messages       ----- Message -----   From: Librado Chamberlain RN   Sent: 10/30/2023  12:09 PM EDT   To: Bryn Stuart   Subject: Appt Needed                                       Pt called today for a refill of her HRT. She has not been seen since 2021 by Dr Chelsea Lenz. Can you please set her up with an annual and let me know that date? Once its set up I will call her refill in to get her to that appt. Thanks! Pt will need to schedule annual before any medication refills can be sent in for her.

## 2023-11-07 ENCOUNTER — OFFICE VISIT (OUTPATIENT)
Dept: OBGYN CLINIC | Age: 68
End: 2023-11-07
Payer: COMMERCIAL

## 2023-11-07 VITALS
SYSTOLIC BLOOD PRESSURE: 118 MMHG | WEIGHT: 91.2 LBS | HEIGHT: 60 IN | DIASTOLIC BLOOD PRESSURE: 64 MMHG | BODY MASS INDEX: 17.91 KG/M2

## 2023-11-07 DIAGNOSIS — Z13.820 ENCOUNTER FOR OSTEOPOROSIS SCREENING IN ASYMPTOMATIC POSTMENOPAUSAL PATIENT: ICD-10-CM

## 2023-11-07 DIAGNOSIS — Z78.0 ENCOUNTER FOR OSTEOPOROSIS SCREENING IN ASYMPTOMATIC POSTMENOPAUSAL PATIENT: ICD-10-CM

## 2023-11-07 DIAGNOSIS — Z12.31 ENCOUNTER FOR SCREENING MAMMOGRAM FOR MALIGNANT NEOPLASM OF BREAST: ICD-10-CM

## 2023-11-07 DIAGNOSIS — Z01.419 WELL WOMAN EXAM WITH ROUTINE GYNECOLOGICAL EXAM: Primary | ICD-10-CM

## 2023-11-07 DIAGNOSIS — Z11.51 SCREENING FOR HUMAN PAPILLOMAVIRUS (HPV): ICD-10-CM

## 2023-11-07 PROCEDURE — 3078F DIAST BP <80 MM HG: CPT | Performed by: OBSTETRICS & GYNECOLOGY

## 2023-11-07 PROCEDURE — 99397 PER PM REEVAL EST PAT 65+ YR: CPT | Performed by: OBSTETRICS & GYNECOLOGY

## 2023-11-07 PROCEDURE — 3074F SYST BP LT 130 MM HG: CPT | Performed by: OBSTETRICS & GYNECOLOGY

## 2023-11-07 RX ORDER — ESTRADIOL CYPIONATE 5 MG/ML
INJECTION INTRAMUSCULAR
Status: CANCELLED | OUTPATIENT
Start: 2023-11-07

## 2023-11-07 NOTE — PROGRESS NOTES
Surgically absent  Adnexa: - Normal.   Bladder - Normal.     Peripheral Vascular   Normal    Neuropsychiatric   Examination of related systems reveals - The patient is well-nourished and well-groomed. Mental status exam performed with findings of - Oriented X3 with appropriate mood and affect. Musculoskeletal  Normal      General Lymphatics  Normal           Medical problems and test results were reviewed with the patient today. ASSESSMENT and PLAN    1. Well woman exam with routine gynecological exam  -     PAP IG, HPV Rfx HPV 16/18,45; Future  2. Screening for human papillomavirus (HPV)  -     PAP IG, HPV Rfx HPV 16/18,45; Future  3. Encounter for screening mammogram for malignant neoplasm of breast  -     ROJAS CANDY DIGITAL SCREEN BILATERAL; Future  4. Encounter for osteoporosis screening in asymptomatic postmenopausal patient  -     DEXA BONE DENSITY AXIAL SKELETON; Future           No follow-ups on file.         Stephany Manzanares MD  11/07/23

## 2023-11-13 LAB
COLLECTION METHOD: NORMAL
CYTOLOGIST CVX/VAG CYTO: NORMAL
CYTOLOGY CVX/VAG DOC THIN PREP: NORMAL
HPV APTIMA: NEGATIVE
Lab: NORMAL
PAP SOURCE: NORMAL
PATH REPORT.FINAL DX SPEC: NORMAL
PREV TREATMENT: NORMAL
STAT OF ADQ CVX/VAG CYTO-IMP: NORMAL

## 2024-03-11 ENCOUNTER — TELEPHONE (OUTPATIENT)
Dept: OBGYN CLINIC | Age: 69
End: 2024-03-11

## 2024-03-11 NOTE — TELEPHONE ENCOUNTER
Pt LVM stating she is still having LLQ pain. Has seen several doctors/had numerous diagnostic tests w/ no answers. Was suggested she go back to GYN.

## (undated) DEVICE — 2000CC GUARDIAN II: Brand: GUARDIAN

## (undated) DEVICE — FORCEPS BX L240CM JAW DIA2.8MM L CAP W/ NDL MIC MESH TOOTH

## (undated) DEVICE — CONTAINER PREFIL FRMLN 40ML --

## (undated) DEVICE — BIPOLAR FORCEPS CORD: Brand: VALLEYLAB

## (undated) DEVICE — KENDALL RADIOLUCENT FOAM MONITORING ELECTRODE RECTANGULAR SHAPE: Brand: KENDALL

## (undated) DEVICE — NEEDLE HYPO 25GA L1.5IN BVL ORIENTED ECLIPSE

## (undated) DEVICE — CROUCH CORNEAL PROTECTOR: Brand: BAUSCH + LOMB

## (undated) DEVICE — KIT PROCEDURE SURG HEAD AND NECK TOTE

## (undated) DEVICE — SUTURE PROL SZ 6-0 L18IN NONABSORBABLE BLU P-3 L13MM 3/8 8695G

## (undated) DEVICE — BUTTON SWITCH PENCIL BLADE ELECTRODE, HOLSTER: Brand: EDGE

## (undated) DEVICE — 3M™ STERI-STRIP™ BLEND TONE SKIN CLOSURES, B1551, TAN, 1/4 IN X 3 IN (6 MM X 75 MM), 3 STRIPS/ENVELOPE: Brand: 3M™ STERI-STRIP™

## (undated) DEVICE — CANNULA NSL ORAL AD FOR CAPNOFLEX CO2 O2 AIRLFE

## (undated) DEVICE — STICK ABSRB L6.6CM DIA5MM VISISORB

## (undated) DEVICE — REM POLYHESIVE ADULT PATIENT RETURN ELECTRODE: Brand: VALLEYLAB

## (undated) DEVICE — NEEDLE HYPO 25GA L1.5IN BLU POLYPR HUB S STL REG BVL STR

## (undated) DEVICE — MASTISOL ADHESIVE LIQ 2/3ML

## (undated) DEVICE — CARDINAL HEALTH FLEXIBLE LIGHT HANDLE COVER: Brand: CARDINAL HEALTH

## (undated) DEVICE — SKIN MARKER,FINE TIP: Brand: DEVON

## (undated) DEVICE — SOLUTION IV 1000ML 0.9% SOD CHL

## (undated) DEVICE — GARMENT,MEDLINE,DVT,INT,CALF,MED, GEN2: Brand: MEDLINE

## (undated) DEVICE — BLOCK BITE AD 60FR W/ VELC STRP ADDRESSES MOST PT AND

## (undated) DEVICE — BLADE SURG NO15 S STL STR DISP GLASSVAN

## (undated) DEVICE — CONNECTOR TBNG OD5-7MM O2 END DISP